# Patient Record
Sex: FEMALE | Race: WHITE | URBAN - METROPOLITAN AREA
[De-identification: names, ages, dates, MRNs, and addresses within clinical notes are randomized per-mention and may not be internally consistent; named-entity substitution may affect disease eponyms.]

---

## 2020-03-05 LAB
BILIRUB SERPL-MCNC: NEGATIVE MG/DL
BLOOD URINE, POC: NORMAL
CLARITY, POC UA: CLEAR
COLOR, POC UA: YELLOW
KETONES UR QL STRIP: NEGATIVE
LEUKOCYTE EST, POC UA: NEGATIVE
NITRITE, POC UA: NEGATIVE
PH, POC UA: 5
PROTEIN, POC: NEGATIVE
SPECIFIC GRAVITY, POC UA: 1.01
UROBILINOGEN, POC UA: NORMAL

## 2020-05-01 LAB
BILIRUB SERPL-MCNC: NEGATIVE MG/DL
BLOOD URINE, POC: NORMAL
CLARITY, POC UA: CLEAR
COLOR, POC UA: YELLOW
GLUCOSE UR QL STRIP: NEGATIVE
KETONES UR QL STRIP: NEGATIVE
LEUKOCYTE EST, POC UA: NEGATIVE
NITRITE, POC UA: NEGATIVE
PH, POC UA: 7
PROTEIN, POC: NEGATIVE
SPECIFIC GRAVITY, POC UA: 1.02
UROBILINOGEN, POC UA: NORMAL

## 2020-10-15 ENCOUNTER — HISTORICAL (OUTPATIENT)
Dept: SURGERY | Facility: HOSPITAL | Age: 45
End: 2020-10-15

## 2020-10-15 LAB — POC BETA-HCG (QUAL): NEGATIVE

## 2020-12-15 LAB — RAPID GROUP A STREP (OHS): POSITIVE

## 2022-04-09 ENCOUNTER — HISTORICAL (OUTPATIENT)
Dept: ADMINISTRATIVE | Facility: HOSPITAL | Age: 47
End: 2022-04-09

## 2022-04-27 VITALS
OXYGEN SATURATION: 97 % | HEIGHT: 66 IN | DIASTOLIC BLOOD PRESSURE: 76 MMHG | BODY MASS INDEX: 21.93 KG/M2 | SYSTOLIC BLOOD PRESSURE: 120 MMHG | WEIGHT: 136.44 LBS

## 2022-04-30 NOTE — OP NOTE
DATE OF SURGERY:    10/15/2020    SURGEON:  Pasha Yang Jr, MD  ASSISTANT:  IRISH Lantigua    PREOPERATIVE DIAGNOSES:    1. Right ring finger mass.  2. Right ring finger trigger.    POSTOPERATIVE DIAGNOSES:    1. Right ring finger mass.  2. Right ring finger trigger.    PROCEDURES:    1. Right ring finger mass excision.  2. Right ring finger trigger release.     Assistant, MsMindy Sosa, was essential in retraction and in closure.    COMPLICATIONS:  None.    IMPLANTS:  None.    ESTIMATED BLOOD LOSS:  Minimal.    TOURNIQUET TIME:  About 10 minutes.    HISTORY OF PRESENT ILLNESS:  Rola is a very pleasant 45-year-old who has had recurring right ring finger pain.  Radiographs showed a calcific deposit along the tendon sheath, and I recommended the above-mentioned procedure.  I discussed with her the risks, benefits, and alternative therapy.  She elected to proceed.    DESCRIPTION OF PROCEDURE:  Lana was initially seen in the preoperative unit, where the history and physical were reviewed without change.  Her finger was marked.  Her consents were reviewed.  All questions were answered.  She was taken to the operating room and placed supine on the table, where general anesthesia was induced.  Her right upper extremity was prepped and draped in a sterile fashion.  An attending-led time-out confirmed the operative side.  Preoperative antibiotics were administered, and we began the procedure.     I made an incision directly over the palm of the hand.  I sharply incised through skin and spread down through subcutaneous tissue.  I was able to identify the calcific mass and removed it in one.  It was on the radial side of the tendon sheath.  I sent this to Pathology.  I then incised the A1 pulley in line with the tendon.  On inspection of the tendon, there were no lesions.       Happy with this, I irrigated the wound and closed the incision in layers.  Sterile dressing was placed.  She was awoken from anesthesia  and transferred to the postop unit in good condition.        ______________________________  MD DARSHANA Murdock Jr/MAGDA  DD:  10/15/2020  Time:  07:03AM  DT:  10/15/2020  Time:  07:13AM  Job #:  [id number]

## 2022-09-16 ENCOUNTER — HISTORICAL (OUTPATIENT)
Dept: ADMINISTRATIVE | Facility: HOSPITAL | Age: 47
End: 2022-09-16